# Patient Record
Sex: FEMALE | Race: OTHER | NOT HISPANIC OR LATINO
[De-identification: names, ages, dates, MRNs, and addresses within clinical notes are randomized per-mention and may not be internally consistent; named-entity substitution may affect disease eponyms.]

---

## 2023-03-21 ENCOUNTER — NON-APPOINTMENT (OUTPATIENT)
Age: 23
End: 2023-03-21

## 2023-11-29 ENCOUNTER — APPOINTMENT (OUTPATIENT)
Dept: PSYCHIATRY | Facility: CLINIC | Age: 23
End: 2023-11-29
Payer: MEDICAID

## 2023-11-29 PROBLEM — Z00.00 ENCOUNTER FOR PREVENTIVE HEALTH EXAMINATION: Status: ACTIVE | Noted: 2023-11-29

## 2023-11-29 PROCEDURE — 90791 PSYCH DIAGNOSTIC EVALUATION: CPT

## 2023-12-04 ENCOUNTER — APPOINTMENT (OUTPATIENT)
Dept: PSYCHIATRY | Facility: CLINIC | Age: 23
End: 2023-12-04
Payer: MEDICAID

## 2023-12-04 PROCEDURE — ZZZZZ: CPT

## 2023-12-27 ENCOUNTER — EMERGENCY (EMERGENCY)
Facility: HOSPITAL | Age: 23
LOS: 1 days | Discharge: ANOTHER HOSPITAL | End: 2023-12-27
Attending: EMERGENCY MEDICINE
Payer: MEDICAID

## 2023-12-27 VITALS
TEMPERATURE: 98 F | SYSTOLIC BLOOD PRESSURE: 112 MMHG | DIASTOLIC BLOOD PRESSURE: 69 MMHG | RESPIRATION RATE: 18 BRPM | HEART RATE: 99 BPM | OXYGEN SATURATION: 99 % | WEIGHT: 110.01 LBS

## 2023-12-27 DIAGNOSIS — F33.2 MAJOR DEPRESSIVE DISORDER, RECURRENT SEVERE WITHOUT PSYCHOTIC FEATURES: ICD-10-CM

## 2023-12-27 LAB
AMPHET UR-MCNC: NEGATIVE — SIGNIFICANT CHANGE UP
AMPHET UR-MCNC: NEGATIVE — SIGNIFICANT CHANGE UP
ANION GAP SERPL CALC-SCNC: 4 MMOL/L — LOW (ref 5–17)
ANION GAP SERPL CALC-SCNC: 4 MMOL/L — LOW (ref 5–17)
APAP SERPL-MCNC: <2 UG/ML — LOW (ref 10–30)
APAP SERPL-MCNC: <2 UG/ML — LOW (ref 10–30)
APPEARANCE UR: CLEAR — SIGNIFICANT CHANGE UP
APPEARANCE UR: CLEAR — SIGNIFICANT CHANGE UP
BARBITURATES UR SCN-MCNC: NEGATIVE — SIGNIFICANT CHANGE UP
BARBITURATES UR SCN-MCNC: NEGATIVE — SIGNIFICANT CHANGE UP
BASOPHILS # BLD AUTO: 0.03 K/UL — SIGNIFICANT CHANGE UP (ref 0–0.2)
BASOPHILS # BLD AUTO: 0.03 K/UL — SIGNIFICANT CHANGE UP (ref 0–0.2)
BASOPHILS NFR BLD AUTO: 0.3 % — SIGNIFICANT CHANGE UP (ref 0–2)
BASOPHILS NFR BLD AUTO: 0.3 % — SIGNIFICANT CHANGE UP (ref 0–2)
BENZODIAZ UR-MCNC: NEGATIVE — SIGNIFICANT CHANGE UP
BENZODIAZ UR-MCNC: NEGATIVE — SIGNIFICANT CHANGE UP
BILIRUB UR-MCNC: NEGATIVE — SIGNIFICANT CHANGE UP
BILIRUB UR-MCNC: NEGATIVE — SIGNIFICANT CHANGE UP
BUN SERPL-MCNC: 12 MG/DL — SIGNIFICANT CHANGE UP (ref 7–18)
BUN SERPL-MCNC: 12 MG/DL — SIGNIFICANT CHANGE UP (ref 7–18)
CALCIUM SERPL-MCNC: 9.3 MG/DL — SIGNIFICANT CHANGE UP (ref 8.4–10.5)
CALCIUM SERPL-MCNC: 9.3 MG/DL — SIGNIFICANT CHANGE UP (ref 8.4–10.5)
CHLORIDE SERPL-SCNC: 104 MMOL/L — SIGNIFICANT CHANGE UP (ref 96–108)
CHLORIDE SERPL-SCNC: 104 MMOL/L — SIGNIFICANT CHANGE UP (ref 96–108)
CO2 SERPL-SCNC: 29 MMOL/L — SIGNIFICANT CHANGE UP (ref 22–31)
CO2 SERPL-SCNC: 29 MMOL/L — SIGNIFICANT CHANGE UP (ref 22–31)
COCAINE METAB.OTHER UR-MCNC: NEGATIVE — SIGNIFICANT CHANGE UP
COCAINE METAB.OTHER UR-MCNC: NEGATIVE — SIGNIFICANT CHANGE UP
COLOR SPEC: YELLOW — SIGNIFICANT CHANGE UP
COLOR SPEC: YELLOW — SIGNIFICANT CHANGE UP
CREAT SERPL-MCNC: 0.86 MG/DL — SIGNIFICANT CHANGE UP (ref 0.5–1.3)
CREAT SERPL-MCNC: 0.86 MG/DL — SIGNIFICANT CHANGE UP (ref 0.5–1.3)
DIFF PNL FLD: NEGATIVE — SIGNIFICANT CHANGE UP
DIFF PNL FLD: NEGATIVE — SIGNIFICANT CHANGE UP
EGFR: 98 ML/MIN/1.73M2 — SIGNIFICANT CHANGE UP
EGFR: 98 ML/MIN/1.73M2 — SIGNIFICANT CHANGE UP
EOSINOPHIL # BLD AUTO: 0.1 K/UL — SIGNIFICANT CHANGE UP (ref 0–0.5)
EOSINOPHIL # BLD AUTO: 0.1 K/UL — SIGNIFICANT CHANGE UP (ref 0–0.5)
EOSINOPHIL NFR BLD AUTO: 1 % — SIGNIFICANT CHANGE UP (ref 0–6)
EOSINOPHIL NFR BLD AUTO: 1 % — SIGNIFICANT CHANGE UP (ref 0–6)
ETHANOL SERPL-MCNC: <3 MG/DL — SIGNIFICANT CHANGE UP (ref 0–10)
ETHANOL SERPL-MCNC: <3 MG/DL — SIGNIFICANT CHANGE UP (ref 0–10)
GLUCOSE SERPL-MCNC: 94 MG/DL — SIGNIFICANT CHANGE UP (ref 70–99)
GLUCOSE SERPL-MCNC: 94 MG/DL — SIGNIFICANT CHANGE UP (ref 70–99)
GLUCOSE UR QL: NEGATIVE MG/DL — SIGNIFICANT CHANGE UP
GLUCOSE UR QL: NEGATIVE MG/DL — SIGNIFICANT CHANGE UP
HCG SERPL-ACNC: <1 MIU/ML — SIGNIFICANT CHANGE UP
HCG SERPL-ACNC: <1 MIU/ML — SIGNIFICANT CHANGE UP
HCT VFR BLD CALC: 39.3 % — SIGNIFICANT CHANGE UP (ref 34.5–45)
HCT VFR BLD CALC: 39.3 % — SIGNIFICANT CHANGE UP (ref 34.5–45)
HGB BLD-MCNC: 13 G/DL — SIGNIFICANT CHANGE UP (ref 11.5–15.5)
HGB BLD-MCNC: 13 G/DL — SIGNIFICANT CHANGE UP (ref 11.5–15.5)
IMM GRANULOCYTES NFR BLD AUTO: 0.3 % — SIGNIFICANT CHANGE UP (ref 0–0.9)
IMM GRANULOCYTES NFR BLD AUTO: 0.3 % — SIGNIFICANT CHANGE UP (ref 0–0.9)
KETONES UR-MCNC: NEGATIVE MG/DL — SIGNIFICANT CHANGE UP
KETONES UR-MCNC: NEGATIVE MG/DL — SIGNIFICANT CHANGE UP
LEUKOCYTE ESTERASE UR-ACNC: NEGATIVE — SIGNIFICANT CHANGE UP
LEUKOCYTE ESTERASE UR-ACNC: NEGATIVE — SIGNIFICANT CHANGE UP
LYMPHOCYTES # BLD AUTO: 2.74 K/UL — SIGNIFICANT CHANGE UP (ref 1–3.3)
LYMPHOCYTES # BLD AUTO: 2.74 K/UL — SIGNIFICANT CHANGE UP (ref 1–3.3)
LYMPHOCYTES # BLD AUTO: 28.1 % — SIGNIFICANT CHANGE UP (ref 13–44)
LYMPHOCYTES # BLD AUTO: 28.1 % — SIGNIFICANT CHANGE UP (ref 13–44)
MCHC RBC-ENTMCNC: 30 PG — SIGNIFICANT CHANGE UP (ref 27–34)
MCHC RBC-ENTMCNC: 30 PG — SIGNIFICANT CHANGE UP (ref 27–34)
MCHC RBC-ENTMCNC: 33.1 GM/DL — SIGNIFICANT CHANGE UP (ref 32–36)
MCHC RBC-ENTMCNC: 33.1 GM/DL — SIGNIFICANT CHANGE UP (ref 32–36)
MCV RBC AUTO: 90.8 FL — SIGNIFICANT CHANGE UP (ref 80–100)
MCV RBC AUTO: 90.8 FL — SIGNIFICANT CHANGE UP (ref 80–100)
METHADONE UR-MCNC: NEGATIVE — SIGNIFICANT CHANGE UP
METHADONE UR-MCNC: NEGATIVE — SIGNIFICANT CHANGE UP
MONOCYTES # BLD AUTO: 0.79 K/UL — SIGNIFICANT CHANGE UP (ref 0–0.9)
MONOCYTES # BLD AUTO: 0.79 K/UL — SIGNIFICANT CHANGE UP (ref 0–0.9)
MONOCYTES NFR BLD AUTO: 8.1 % — SIGNIFICANT CHANGE UP (ref 2–14)
MONOCYTES NFR BLD AUTO: 8.1 % — SIGNIFICANT CHANGE UP (ref 2–14)
NEUTROPHILS # BLD AUTO: 6.07 K/UL — SIGNIFICANT CHANGE UP (ref 1.8–7.4)
NEUTROPHILS # BLD AUTO: 6.07 K/UL — SIGNIFICANT CHANGE UP (ref 1.8–7.4)
NEUTROPHILS NFR BLD AUTO: 62.2 % — SIGNIFICANT CHANGE UP (ref 43–77)
NEUTROPHILS NFR BLD AUTO: 62.2 % — SIGNIFICANT CHANGE UP (ref 43–77)
NITRITE UR-MCNC: NEGATIVE — SIGNIFICANT CHANGE UP
NITRITE UR-MCNC: NEGATIVE — SIGNIFICANT CHANGE UP
NRBC # BLD: 0 /100 WBCS — SIGNIFICANT CHANGE UP (ref 0–0)
NRBC # BLD: 0 /100 WBCS — SIGNIFICANT CHANGE UP (ref 0–0)
OPIATES UR-MCNC: NEGATIVE — SIGNIFICANT CHANGE UP
OPIATES UR-MCNC: NEGATIVE — SIGNIFICANT CHANGE UP
PCP SPEC-MCNC: SIGNIFICANT CHANGE UP
PCP SPEC-MCNC: SIGNIFICANT CHANGE UP
PCP UR-MCNC: NEGATIVE — SIGNIFICANT CHANGE UP
PCP UR-MCNC: NEGATIVE — SIGNIFICANT CHANGE UP
PH UR: 5.5 — SIGNIFICANT CHANGE UP (ref 5–8)
PH UR: 5.5 — SIGNIFICANT CHANGE UP (ref 5–8)
PLATELET # BLD AUTO: 272 K/UL — SIGNIFICANT CHANGE UP (ref 150–400)
PLATELET # BLD AUTO: 272 K/UL — SIGNIFICANT CHANGE UP (ref 150–400)
POTASSIUM SERPL-MCNC: 4 MMOL/L — SIGNIFICANT CHANGE UP (ref 3.5–5.3)
POTASSIUM SERPL-MCNC: 4 MMOL/L — SIGNIFICANT CHANGE UP (ref 3.5–5.3)
POTASSIUM SERPL-SCNC: 4 MMOL/L — SIGNIFICANT CHANGE UP (ref 3.5–5.3)
POTASSIUM SERPL-SCNC: 4 MMOL/L — SIGNIFICANT CHANGE UP (ref 3.5–5.3)
PROT UR-MCNC: NEGATIVE MG/DL — SIGNIFICANT CHANGE UP
PROT UR-MCNC: NEGATIVE MG/DL — SIGNIFICANT CHANGE UP
RBC # BLD: 4.33 M/UL — SIGNIFICANT CHANGE UP (ref 3.8–5.2)
RBC # BLD: 4.33 M/UL — SIGNIFICANT CHANGE UP (ref 3.8–5.2)
RBC # FLD: 12.5 % — SIGNIFICANT CHANGE UP (ref 10.3–14.5)
RBC # FLD: 12.5 % — SIGNIFICANT CHANGE UP (ref 10.3–14.5)
SALICYLATES SERPL-MCNC: <1.7 MG/DL — LOW (ref 2.8–20)
SALICYLATES SERPL-MCNC: <1.7 MG/DL — LOW (ref 2.8–20)
SARS-COV-2 RNA SPEC QL NAA+PROBE: SIGNIFICANT CHANGE UP
SARS-COV-2 RNA SPEC QL NAA+PROBE: SIGNIFICANT CHANGE UP
SODIUM SERPL-SCNC: 137 MMOL/L — SIGNIFICANT CHANGE UP (ref 135–145)
SODIUM SERPL-SCNC: 137 MMOL/L — SIGNIFICANT CHANGE UP (ref 135–145)
SP GR SPEC: 1.01 — SIGNIFICANT CHANGE UP (ref 1–1.03)
SP GR SPEC: 1.01 — SIGNIFICANT CHANGE UP (ref 1–1.03)
THC UR QL: NEGATIVE — SIGNIFICANT CHANGE UP
THC UR QL: NEGATIVE — SIGNIFICANT CHANGE UP
UROBILINOGEN FLD QL: 0.2 MG/DL — SIGNIFICANT CHANGE UP (ref 0.2–1)
UROBILINOGEN FLD QL: 0.2 MG/DL — SIGNIFICANT CHANGE UP (ref 0.2–1)
WBC # BLD: 9.76 K/UL — SIGNIFICANT CHANGE UP (ref 3.8–10.5)
WBC # BLD: 9.76 K/UL — SIGNIFICANT CHANGE UP (ref 3.8–10.5)
WBC # FLD AUTO: 9.76 K/UL — SIGNIFICANT CHANGE UP (ref 3.8–10.5)
WBC # FLD AUTO: 9.76 K/UL — SIGNIFICANT CHANGE UP (ref 3.8–10.5)

## 2023-12-27 PROCEDURE — 81003 URINALYSIS AUTO W/O SCOPE: CPT

## 2023-12-27 PROCEDURE — 87635 SARS-COV-2 COVID-19 AMP PRB: CPT

## 2023-12-27 PROCEDURE — 99285 EMERGENCY DEPT VISIT HI MDM: CPT

## 2023-12-27 PROCEDURE — 99285 EMERGENCY DEPT VISIT HI MDM: CPT | Mod: 25

## 2023-12-27 PROCEDURE — 90792 PSYCH DIAG EVAL W/MED SRVCS: CPT | Mod: 95

## 2023-12-27 PROCEDURE — 80048 BASIC METABOLIC PNL TOTAL CA: CPT

## 2023-12-27 PROCEDURE — 93005 ELECTROCARDIOGRAM TRACING: CPT

## 2023-12-27 PROCEDURE — 85025 COMPLETE CBC W/AUTO DIFF WBC: CPT

## 2023-12-27 PROCEDURE — 80307 DRUG TEST PRSMV CHEM ANLYZR: CPT

## 2023-12-27 PROCEDURE — 36415 COLL VENOUS BLD VENIPUNCTURE: CPT

## 2023-12-27 PROCEDURE — 84702 CHORIONIC GONADOTROPIN TEST: CPT
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
## 2023-12-27 NOTE — ED ADULT NURSE REASSESSMENT NOTE - NS ED NURSE REASSESS COMMENT FT1
Patient taken to holding area for telepsych consult on tele cart. Patient calm and cooperative. 1:1 with patient.

## 2023-12-27 NOTE — ED PROVIDER NOTE - CLINICAL SUMMARY MEDICAL DECISION MAKING FREE TEXT BOX
23 yr old female with hx of schizotypal? vs schizoeffective on abilify 8mg? buspar 10mg and something for sleep presents to ed c/o si thoughts, depressed mood. 1 day ago thought about OD. didn't OD. when she was 20 tried to OD on advil 8 pills and cut self. pt constantly self doubts, feels lonely. wants help.    psych eval- psychosis vs Mood D/O vs schizo. labs, ekg, maintain 1:1 23 yr old female with hx of schizotypal? vs schizoeffective on abilify 8mg? buspar 10mg and something for sleep presents to ed c/o si thoughts, depressed mood. 1 day ago thought about OD. didn't OD. when she was 20 tried to OD on advil 8 pills and cut self. pt constantly self doubts, feels lonely. wants help.    psych eval- psychosis vs Mood D/O vs schizo. labs, ekg, maintain 1:1    [place] GREEN[place]: ER attending:  Patient is medically cleared for inpatient psych admission
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
## 2023-12-27 NOTE — ED ADULT NURSE NOTE - ED STAT RN HANDOFF TIME
Clinical Summary
  Created on: 2019
 
 Margaret Ferreira
 External Reference #: [id number]
 : 31
 Sex: Female
 
 Demographics
 
 
+-----------------------+----------------------+
| Address               | [address]        |
|                       | [place]  [address] |
+-----------------------+----------------------+
| Home Phone            | [phone]      |
+-----------------------+----------------------+
| Preferred Language    | Unknown              |
+-----------------------+----------------------+
| Marital Status        |               |
+-----------------------+----------------------+
| Orthodoxy Affiliation | Unknown              |
+-----------------------+----------------------+
| Race                  | Unknown              |
+-----------------------+----------------------+
| Ethnic Group          | Unknown              |
+-----------------------+----------------------+
 
 
 Author
 
 
+--------------+--------------------------------------------+
| Author       | [facility]  |
|              | and [place]                                |
+--------------+--------------------------------------------+
| Organization | [facility]  |
|              | and [place]                                |
+--------------+--------------------------------------------+
| Address      | Unknown                                    |
+--------------+--------------------------------------------+
| Phone        | Unavailable                                |
+--------------+--------------------------------------------+
 
 
 
 Support
 
 
+--------------+--------------+---------+-----------------+
| Name         | Relationship | Address | Phone           |
+--------------+--------------+---------+-----------------+
| Lawson Hanna | ECON         | Unknown | [phone] |
+--------------+--------------+---------+-----------------+
 
 
 
 Care Team Providers
 
 
 
+-----------------------------+------+-----------------+
| Care Team Member Name       | Role | Phone           |
+-----------------------------+------+-----------------+
| Bessy Paulino | PCP  | [phone] |
|  PA-C                       |      |                 |
+-----------------------------+------+-----------------+
 
 
 
 Allergies
 
 
+----------------+----------------------+----------+----------+----------------------+
| Active Allergy | Reactions            | Severity | Noted    | Comments             |
|                |                      |          | Date     |                      |
+----------------+----------------------+----------+----------+----------------------+
| Lisinopril     | Other (See Comments) | Low      | 20 |   Cough              |
|                |                      |          | 14       |                      |
+----------------+----------------------+----------+----------+----------------------+
| Naproxen       | Other (See Comments) |          | 20 |   Reaction not       |
|                |                      |          | 18       | specified in outside |
|                |                      |          |          |  medical records     |
+----------------+----------------------+----------+----------+----------------------+
 
 
 
 Medications
 
 
+----------------------+----------------------+-----------+---------+------+------+-------+
| Medication           | Sig                  | Dispensed | Refills | Star | End  | Statu |
|                      |                      |           |         | t    | Date | s     |
|                      |                      |           |         | Date |      |       |
+----------------------+----------------------+-----------+---------+------+------+-------+
|   Calcium Carbonate  | Take 600 mg by mouth |           | 0       |      |      | Activ |
| (CALCIUM 600 PO)     |  Daily.              |           |         |      |      | e     |
+----------------------+----------------------+-----------+---------+------+------+-------+
|   Docosahexaenoic    | Take  by mouth.      |           | 0       |      |      | Activ |
| Acid (DHA OMEGA 3)   |                      |           |         |      |      | e     |
| 100 MG CAPS          |                      |           |         |      |      |       |
+----------------------+----------------------+-----------+---------+------+------+-------+
|   Multiple           | Take  by mouth       |           | 0       |      |      | Activ |
| Vitamins-Minerals    | Daily.               |           |         |      |      | e     |
| (MULTIVITAMIN PO)    |                      |           |         |      |      |       |
+----------------------+----------------------+-----------+---------+------+------+-------+
|   omeprazole         | Take 20 mg by mouth  |           | 0       |      |      | Activ |
| (PRILOSEC) 20 mg     | every morning        |           |         |      |      | e     |
| capsule              | (before breakfast).  |           |         |      |      |       |
+----------------------+----------------------+-----------+---------+------+------+-------+
|   traMADol (ULTRAM)  | Take 100 mg by mouth |           | 0       |      |      | Activ |
| 50 mg tablet         |  4 times daily.      |           |         |      |      | e     |
+----------------------+----------------------+-----------+---------+------+------+-------+
|   Cholecalciferol    | Take  by mouth       |           | 0       |      |      | Activ |
| (VITAMIN D-3) 2000   | Daily.               |           |         |      |      | e     |
| units CAPS           |                      |           |         |      |      |       |
+----------------------+----------------------+-----------+---------+------+------+-------+
|   Spacer/Aero        | Use with inhaler as  |   1 each  | 1       | 01/1 |      | Activ |
| Chamber Mouthpiece   | directed.            |           |         | 3/20 |      | e     |
 
| MISC                 |                      |           |         | 15   |      |       |
+----------------------+----------------------+-----------+---------+------+------+-------+
|   albuterol (PROAIR  | Inhale 2 puffs into  |   1       | 5       | 03/2 |      | Activ |
| HFA) 90 mcg/puff     | the lungs every 6    | Inhaler   |         | 2/20 |      | e     |
| inhaler              | hours as needed for  |           |         | 16   |      |       |
|                      | Wheezing or          |           |         |      |      |       |
|                      | Shortness of Breath. |           |         |      |      |       |
+----------------------+----------------------+-----------+---------+------+------+-------+
|   losartan (COZAAR)  | Take 100 mg by mouth |           | 0       |      |      | Activ |
| 100 MG tablet        |  Daily.              |           |         |      |      | e     |
+----------------------+----------------------+-----------+---------+------+------+-------+
|   NITROFURANTOIN     | Take  by mouth       |           | 0       |      |      | Activ |
| MACROCRYSTAL PO      | Daily.               |           |         |      |      | e     |
+----------------------+----------------------+-----------+---------+------+------+-------+
|   DULoxetine         | Take 60 mg by mouth  |           | 0       |      |      | Activ |
| (CYMBALTA) 60 mg DR  | Daily.               |           |         |      |      | e     |
| capsule              |                      |           |         |      |      |       |
+----------------------+----------------------+-----------+---------+------+------+-------+
|   gabapentin         | Take 300 mg by mouth |           | 0       |      |      | Activ |
| (NEURONTIN) 300 mg   |  3 times daily.      |           |         |      |      | e     |
| capsule              |                      |           |         |      |      |       |
+----------------------+----------------------+-----------+---------+------+------+-------+
|                      | Inhale 1 puff into   |           | 0       | 04/1 |      | Activ |
| umeclidinium-vilante | the lungs.           |           |         | 0/20 |      | e     |
| rol (ANORO ELLIPTA)  |                      |           |         | 18   |      |       |
| 62.5-25 mcg/puff     |                      |           |         |      |      |       |
| inhaler              |                      |           |         |      |      |       |
+----------------------+----------------------+-----------+---------+------+------+-------+
|   diclofenac         | Take 50 mg by mouth  |           | 0       |      |      | Activ |
| (VOLTAREN) 50 mg EC  | 2 times daily.       |           |         |      |      | e     |
| tablet               |                      |           |         |      |      |       |
+----------------------+----------------------+-----------+---------+------+------+-------+
|   pramipexole        | TAKE ONE TABLET BY   |   180     | 0       | 09/0 |      | Activ |
| (MIRAPEX) 0.25 mg    | MOUTH TWICE A DAY    | tablet    |         | 5/20 |      | e     |
| tablet               |                      |           |         | 18   |      |       |
+----------------------+----------------------+-----------+---------+------+------+-------+
|   levothyroxine      | Take 150 mcg by      |           | 0       |      |      | Activ |
| (SYNTHROID) 150 mcg  | mouth every morning. |           |         |      |      | e     |
| tablet               |                      |           |         |      |      |       |
+----------------------+----------------------+-----------+---------+------+------+-------+
|   Misc Natural       | Take  by mouth       |           | 0       |      |      | Activ |
| Products             | Daily.               |           |         |      |      | e     |
| (GLUCOS-CHONDROIT-MS |                      |           |         |      |      |       |
| M COMPLEX) TABS      |                      |           |         |      |      |       |
+----------------------+----------------------+-----------+---------+------+------+-------+
|                      | Take 1 tablet by     |           | 0       |      |      | Activ |
| HYDROcodone-acetamin | mouth every 6 hours  |           |         |      |      | e     |
| ophen (NORCO) 5-325  | as needed for Pain.  |           |         |      |      |       |
| mg per tablet        |                      |           |         |      |      |       |
+----------------------+----------------------+-----------+---------+------+------+-------+
|                      | Take  by mouth.      |           | 0       |      |      | Activ |
| GLUCOSAMINE-CHONDROI |                      |           |         |      |      | e     |
| TIN PO               |                      |           |         |      |      |       |
+----------------------+----------------------+-----------+---------+------+------+-------+
|   albuterol 90       | Inhale 2 puffs into  |           | 0       | 02/2 | 02/2 | Activ |
| mcg/puff inhaler     | the lungs every 4    |           |         | 0/20 | 0/20 | e     |
|                      | hours as needed for  |           |         | 19   | 20   |       |
|                      | Wheezing.            |           |         |      |      |       |
+----------------------+----------------------+-----------+---------+------+------+-------+
|                      | Inhale 1 puff into   |           | 0       | 07/2 |      | Activ |
 
| umeclidinium-vilante | the lungs Daily.     |           |         | 4/20 |      | e     |
| rol (ANORO ELLIPTA)  |                      |           |         | 19   |      |       |
| 62.5-25 mcg/puff     |                      |           |         |      |      |       |
| inhaler              |                      |           |         |      |      |       |
+----------------------+----------------------+-----------+---------+------+------+-------+
 
 
 
 Active Problems
 
 
+----------------------------------------------+------------+
| Problem                                      | Noted Date |
+----------------------------------------------+------------+
| COPD (chronic obstructive pulmonary disease) | 2014 |
+----------------------------------------------+------------+
| GERD (gastroesophageal reflux disease)       |            |
+----------------------------------------------+------------+
| Hiatal hernia                                |            |
+----------------------------------------------+------------+
| Hypertension                                 |            |
+----------------------------------------------+------------+
| Hypothyroidism                               |            |
+----------------------------------------------+------------+
| Urinary incontinence                         |            |
+----------------------------------------------+------------+
| Aortic valve stenosis                        |            |
+----------------------------------------------+------------+
 
 
 
+-----------------------+
|   Overview:   trivial |
+-----------------------+
 
 
 
+------------------------------+---+
| Hearing loss                 |   |
+------------------------------+---+
| Osteoarthrosis               |   |
+------------------------------+---+
| Osteoporosis                 |   |
+------------------------------+---+
| RLS (restless legs syndrome) |   |
+------------------------------+---+
| Scoliosis                    |   |
+------------------------------+---+
| Macular degeneration         |   |
+------------------------------+---+
 
 
 
 Resolved Problems
 
 
+-----------------------------------------------------------------+----------+-----------+
| Problem                                                         | Noted    | Resolved  |
|                                                                 | Date     | Date      |
+-----------------------------------------------------------------+----------+-----------+
 
| Cough                                                           | 20 |  |
|                                                                 | 15       | 6         |
+-----------------------------------------------------------------+----------+-----------+
| Need for vaccination with 13-polyvalent pneumococcal conjugate  | 20 |  |
| vaccine                                                         | 15       | 6         |
+-----------------------------------------------------------------+----------+-----------+
 
 
 
 Immunizations
 
 
+----------------------+----------------------+----------+
| Name                 | Administration Dates | Next Due |
+----------------------+----------------------+----------+
| INFLUENZA 66 Y OR >, | 10/21/2015           |          |
|  TRIVALENT HIGH-DOSE |                      |          |
+----------------------+----------------------+----------+
| INFLUENZA PF 19 Y OR | 2014           |          |
|  >,TRIVALENT         |                      |          |
| RECOMBINANT          |                      |          |
+----------------------+----------------------+----------+
| PNEUMOCOCCAL         | 10/12/2015           |          |
| CONJUGATE 13-VALENT  |                      |          |
| (PCV13)              |                      |          |
+----------------------+----------------------+----------+
| PNEUMOCOCCAL         | 2012           |          |
| POLYSACCHARIDE       |                      |          |
| 23-VALENT (PPSV23)   |                      |          |
+----------------------+----------------------+----------+
| TD PF (5 LF TETANUS) | 2009           |          |
|  (ADOL/ADULT)        |                      |          |
+----------------------+----------------------+----------+
 
 
 
 Family History
 
 
+-----------------+----------+------+----------+
| Medical History | Relation | Name | Comments |
+-----------------+----------+------+----------+
| Allergies       | Father   |      |          |
+-----------------+----------+------+----------+
| Asthma          | Father   |      |          |
+-----------------+----------+------+----------+
| COPD            | Father   |      |          |
+-----------------+----------+------+----------+
| Hypertension    | Father   |      |          |
+-----------------+----------+------+----------+
| Tobacco Use     | Father   |      |          |
+-----------------+----------+------+----------+
| Hypertension    | Mother   |      |          |
+-----------------+----------+------+----------+
| Stroke          | Mother   |      |          |
+-----------------+----------+------+----------+
| Asthma          | Sister   |      |          |
+-----------------+----------+------+----------+
 
 
 
 
+----------+------+----------+----------+
| Relation | Name | Status   | Comments |
+----------+------+----------+----------+
| Father   |      |  | COPD     |
+----------+------+----------+----------+
| Mother   |      |  | old age  |
+----------+------+----------+----------+
| Sister   |      | Alive    |          |
+----------+------+----------+----------+
 
 
 
 Social History
 
 
+-------------------+-------+-----------+--------+------+
| Tobacco Use       | Types | Packs/Day | Years  | Date |
|                   |       |           | Used   |      |
+-------------------+-------+-----------+--------+------+
| Passive Smoke     |       |           |        |      |
| Exposure - Never  |       |           |        |      |
| Smoker            |       |           |        |      |
+-------------------+-------+-----------+--------+------+
 
 
 
+---------------------+---+---+---+
| Smokeless Tobacco:  |   |   |   |
| Never Used          |   |   |   |
+---------------------+---+---+---+
 
 
 
+-----------------------------------------+
| Tobacco Cessation: Counseling Given: No |
+-----------------------------------------+
 
 
 
+-------------+----------------------+---------+----------+
| Alcohol Use | Drinks/Week          | oz/Week | Comments |
+-------------+----------------------+---------+----------+
| No          |   0 Standard drinks  | 0.0     |          |
|             | or equivalent        |         |          |
+-------------+----------------------+---------+----------+
 
 
 
+------------------+---------------+
| Sex Assigned at  | Date Recorded |
| Birth            |               |
+------------------+---------------+
| Not on file      |               |
+------------------+---------------+
 
 
 
+----------------+-------------+-------------+
| Job Start Date | Occupation  | Industry    |
 
+----------------+-------------+-------------+
| Not on file    | Not on file | Not on file |
+----------------+-------------+-------------+
 
 
 
+----------------+--------------+------------+
| Travel History | Travel Start | Travel End |
+----------------+--------------+------------+
 
 
 
+-------------------------------------+
| No recent travel history available. |
+-------------------------------------+
 
 
 
 Last Filed Vital Signs
 
 
+-------------------+---------------------+----------------------+----------+
| Vital Sign        | Reading             | Time Taken           | Comments |
+-------------------+---------------------+----------------------+----------+
| Blood Pressure    | 115/64              | 11/15/2018 11:06 AM  |          |
|                   |                     | PST                  |          |
+-------------------+---------------------+----------------------+----------+
| Pulse             | 77                  | 11/15/2018 11:06 AM  |          |
|                   |                     | PST                  |          |
+-------------------+---------------------+----------------------+----------+
| Temperature       | 36.8   C (98.2   F) | 2018 10:07 AM  |          |
|                   |                     | PDT                  |          |
+-------------------+---------------------+----------------------+----------+
| Respiratory Rate  | -                   | -                    |          |
+-------------------+---------------------+----------------------+----------+
| Oxygen Saturation | 97%                 | 2016 11:32 AM  |          |
|                   |                     | PDT                  |          |
+-------------------+---------------------+----------------------+----------+
| Inhaled Oxygen    | -                   | -                    |          |
| Concentration     |                     |                      |          |
+-------------------+---------------------+----------------------+----------+
| Weight            | 61.2 kg (135 lb)    | 11/15/2018 11:06 AM  |          |
|                   |                     | PST                  |          |
+-------------------+---------------------+----------------------+----------+
| Height            | 162.6 cm (5' 4")    | 11/15/2018 11:06 AM  |          |
|                   |                     | PST                  |          |
+-------------------+---------------------+----------------------+----------+
| Body Mass Index   | 23.17               | 11/15/2018 11:06 AM  |          |
|                   |                     | PST                  |          |
+-------------------+---------------------+----------------------+----------+
 
 
 
 Plan of Treatment
 
 
+---------------------+-----------+------------------------+----------+
| Health Maintenance  | Due Date  | Last Done              | Comments |
+---------------------+-----------+------------------------+----------+
| Vaccine: Zoster (1  |  |                        |          |
 
| of 2)               | 0         |                        |          |
+---------------------+-----------+------------------------+----------+
| Vaccine:            |  | 2009             |          |
| Dtap/Tdap/Td (1 -   | 9         |                        |          |
| Tdap)               |           |                        |          |
+---------------------+-----------+------------------------+----------+
| Adult Annual        |  |                        |          |
| Wellness Visit      | 5         |                        |          |
+---------------------+-----------+------------------------+----------+
| Vaccine: Influenza  |  | 10/21/2015, 2014 |          |
| (#1)                | 9         |                        |          |
+---------------------+-----------+------------------------+----------+
| Vaccine:            | Completed | 10/12/2015, 2012 |          |
| Pneumococcal 65+    |           |                        |          |
+---------------------+-----------+------------------------+----------+
 
 
 
 Results
 Not on filefrom Last 3 Months
 
 Insurance
 
 
+----------+--------+-------------+--------+-------------+---------+--------+
| Payer    | Benefi | Subscriber  | Effect | Phone       | Address | Type   |
|          | t Plan | ID          | venecia    |             |         |        |
|          |  /     |             | Dates  |             |         |        |
|          | Group  |             |        |             |         |        |
+----------+--------+-------------+--------+-------------+---------+--------+
| MEDICARE | MEDICA | [id number]  | 19 | [phone] |         | [employer] |
|          | RE     |             | 95-Pre | 5           |         | re     |
|          | PART A |             | sent   |             |         |        |
|          |  AND B |             |        |             |         |        |
+----------+--------+-------------+--------+-------------+---------+--------+
| [employer]     | [employer]ARP   | [id number] | 20 | [phone] |         | Indemn |
|          | MDCR   |             | 15-Pre | 0           |         | ity    |
|          | SUPPL  |             | sent   |             |         |        |
+----------+--------+-------------+--------+-------------+---------+--------+
 
 
 
+----------------------+--------+-------------+--------+-------------+---------------------+
| Guarantor Name       | Accoun | Relation to | Date   | Phone       | Billing Address     |
|                      | t Type |  Patient    | of     |             |                     |
|                      |        |             | Birth  |             |                     |
+----------------------+--------+-------------+--------+-------------+---------------------+
| Margraet Ferreira | Person | Self        | / |             |   [address]     |
|                      | al/Fam |             | 1929   | [phone] | [place] [id number] |
|                      | [place]    |             |        | [address] (Home)    |                     |
+----------------------+--------+-------------+--------+-------------+---------------------+
 
 
 
 Advance Directives
 
 
+-----------+-----------------+----------------+-------------+
| Type      | Date Recorded   | Patient        | Explanation |
|           |                 | Representative |             |
 
+-----------+-----------------+----------------+-------------+
| Power of  |                 |                |             |
|   |                 |                |             |
+-----------+-----------------+----------------+-------------+
| Advance   | 2015  1:04 |                |             |
| Directive |  PM             |                |             |
+-----------+-----------------+----------------+-------------+ 19:08

## 2023-12-27 NOTE — ED BEHAVIORAL HEALTH NOTE - BEHAVIORAL HEALTH NOTE
===================    PRE-HOSPITAL COURSE    ==================    SOURCE:  ED provider and ED documentation     DETAILS:  Pt BiBself for worsening SI with plan to OD.      ============    ED COURSE    ============    SOURCE: ED provider and secondhand ED documentation.    ARRIVAL: Per ED documentation patient BiBself to ED. Patient cooperative with triage process.     BELONGINGS: Unknown   BEHAVIOR: ED provider described patient to be calm, remains in behavioral and impulse control, and is not in restraints. Pt currently has 1:1 sitter.  Pt is not displaying aggression towards staff or others and was described as cooperative. Per provider, pt presenting with flat affect and mood is congruent with affect. Pt has a linear thought process and able to answer questions appropriately. Provider stated that the patient is endorsing current SI with plan to OD. No mention of HI. Per provider pt not endorsing A/VH but did mention an internal voice that is talking to her.  There are no visible marks, bruises, or lacerations on the body. Provider reports that the patient appears to have fair hygiene.   TREATMENT: No medication administered. No restraints.     VISITORS: None     -----------------------------------------------   COVID Exposure Screen- collateral (i.e. third-party, chart review, belongings, etc; include EMS and ED staff)   ---------------------------------------------------   1. Has the patient had a COVID-19 test in the last 90 days? Unknown.   2. Has the patient tested positive for COVID-19 antibodies? Unknown.   3.Has the patient received 2 doses of the COVID-19 vaccine?  Unknown.   4. In the past 10 days, has the patient been around anyone with a positive COVID-19 test?* Unknown.   5.Has the patient been out of [place] within the past 10 days? Unknown.

## 2023-12-27 NOTE — ED BEHAVIORAL HEALTH ASSESSMENT NOTE - NSBHATTESTBILLING_PSY_A_CORE
98112-Hjqyupuulbr diagnostic evaluation with medical services [id number]-[id number] diagnostic evaluation with medical services

## 2023-12-27 NOTE — ED ADULT NURSE NOTE - CHIEF COMPLAINT QUOTE
COLONOSCOPY OPERATIVE NOTE    GASTROENTEROLOGIST: Amy Leon MD    ASSISTANT: None    2022    Etelvina Kenney  : 1969  MRN: [id number]    PRE-OPERATIVE DIAGNOSES / INDICATIONS:  · Colon cancer screening in average risk individual    COLONOSCOPY DONE 2022, REVEALED:  · 2 sessile sigmoid colon polyps were noted, measuring 4 and 5 mm.  These were removed using cold snare polypectomy technique.    · The bowel prep was suboptimal in the cecum making it technically difficult to evaluate the cecum thoroughly.  · The bowel prep involving the remainder of the colon was initially suboptimal upon initial intubation at [place] 5.  After thorough irrigation and suctioning, prep significantly improved to [place] 7 for evaluation of the remainder of the colon was able to be performed.    OPERATION PERFORMED:  · Colonoscopy to the cecum with the help of anesthesia service.    EBL:  None    ANESTHESIA:  Sedation provided by anesthesia service.  Oxygen nasal cannula.    SPECIMENS: See lab results.    COMPLICATIONS: None      IMPLANTS: N/A.    ASSISTANT TASKS: N/A.    DESCRIPTION OF OPERATION:  The risks and benefits of the procedure were explained to the patient, which are not limited only to but included bleeding, infection, missed polyps and lesions, perforation, device malfunction, and death; the patient demonstrated an understanding and then consented to the procedure.  Informed consent discussion included discussion of risks of sedation as well. The history was reviewed and the patient was examined. The patient was stable for the procedure and monitored throughout.      Digital rectal exam revealed normal anal tone. No mass lesions were felt on rectal exam.  The endoscope was then introduced into the rectum and advanced to the cecum. Appendiceal orifice and ileocecal valve were visualized.  Terminal ileum was intubated.  Normal terminal ileal mucosa visualized without polyps, ulcerations, vascular  abnormalities, or additional lesions.  At that point, I slowly and carefully withdrew with attention to all folds, haustra and landmarks and a withdrawal time exceeding 8 minutes.     Terminal ileum: Not evaluated  Cecum:  Normal mucosa  Ascending colon:   Normal mucosa  Transverse colon: Normal mucosa  Descending colon: Normal mucosa  Sigmoid colon:2 sessile sigmoid colon polyps were noted, measuring 4 and 5 mm.  These were removed using cold snare polypectomy technique  Rectum:  Normal mucosa    Retroflexed view of the anal junction was performed and findings were within normal limits. No mass lesions or fissures were noticed in the anal canal.     No active bleeding or signs of recent bleeding visualized.     Bowel preparation was good with a [place] Bowel Preparation Score of 8.     Following the procedure, the colon was decompressed, the endoscope removed and the procedure was terminated. Immediate postoperative condition of patient was normal and the patient was transferred to the recovery area.    RECOMMENDATIONS:  · Recommend repeat colonoscopy in 1 year with extended MiraLax bowel prep and 3 days of clears to better evaluate the cecum.  · High fiber diet.  · Stool softeners as needed.  · Complete hemostasis was also appreciated after the procedure was performed.  Patient was instructed to report to the emergency department if overt gastrointestinal bleeding occurs and verbalized understanding.  · Await pathology with additional management recommendations to follow if necessitated.  · Outpatient clinic follow-up with referring provider      feeling depressed and lonely, suicidal thoughts yesterday (thought of drinking whole bottle of pills), no HI

## 2023-12-27 NOTE — ED BEHAVIORAL HEALTH ASSESSMENT NOTE - DETAILS
NA see hpi will not change dispo will handoff after patient's chart is reviewed by RIMMA self-referred

## 2023-12-27 NOTE — ED ADULT NURSE NOTE - OBJECTIVE STATEMENT
pt came in the er with c/o feeling depressed and lonely, suicidal thoughts yesterday (thought of drinking whole bottle of pills), no HI/safety maintained /side rails up /yellow gown /1;1 started /

## 2023-12-27 NOTE — ED ADULT NURSE NOTE - FINAL NURSING ELECTRONIC SIGNATURE
[facility] [address]  Phone: [phone]  Fax: [phone]    Johana Carrillo MD        September 14, 2020     Patient: Desi Moorcho   YOB: 1948       To Whom It May Concern: It is my medical opinion that Veronica Care requires a disability parking placard for the following reasons:  He cannot walk without assistance from another person or the use of an assistance device (cane, crutch, prosthetic device, wheelchair, etc.). He cannot walk 200 feet without stopping to rest.  Duration of need: permanent    If you have any questions or concerns, please don't hesitate to call.     Sincerely,        Johana Carrillo MD 28-Dec-2023 02:29

## 2023-12-27 NOTE — ED ADULT TRIAGE NOTE - CHIEF COMPLAINT QUOTE
feeling depressed and lonely, suicidal thoughts yesterday (thought of drinking whole bottle of pills), no HI

## 2023-12-27 NOTE — ED BEHAVIORAL HEALTH ASSESSMENT NOTE - SUMMARY
Zully is a 24 y/o English-speaking F, domiciled alone, unemployed, single, with np pmhx and a pphx of MDD and schizotypal disorder presenting to ED by self for worsening symptoms of depression and SI.    Patient meets criteria for a severe depressive episode with suicidality. Patient has insight to her symptoms and is help-seeking. Patient would benefit from inpatient psychiatric hospitalization for safety, stabilization and medication management. She is at elevated chronic risk due to past SA and psychiatric diagnosis, and at elevated acute risk considering she has SI with vague plan. Patient meets criteria for voluntary inpatient psychiatric admission.    Plan:  - Admit 9.13  - Continue home meds  - If agitated can give haldol 2.5mg PO q6hrs if anxious can give ativan 1mg po q6hrs, in severe agitation can give both IM Zully is a 22 y/o English-speaking F, domiciled alone, unemployed, single, with np pmhx and a pphx of MDD and schizotypal disorder presenting to ED by self for worsening symptoms of depression and SI.    Patient meets criteria for a severe depressive episode with suicidality. Patient has insight to her symptoms and is help-seeking. Patient would benefit from inpatient psychiatric hospitalization for safety, stabilization and medication management. She is at elevated chronic risk due to past SA and psychiatric diagnosis, and at elevated acute risk considering she has SI with vague plan. Patient meets criteria for voluntary inpatient psychiatric admission.    Plan:  - Admit 9.13  - Continue home meds  - If agitated can give haldol 2.5mg PO q6hrs if anxious can give ativan 1mg po q6hrs, in severe agitation can give both IM

## 2023-12-27 NOTE — ED PROVIDER NOTE - OBJECTIVE STATEMENT
23 yr old female with hx of schizotypal? vs schizoeffective on abilify 8mg? buspar 10mg and something for sleep presents to ed c/o si thoughts, depressed mood. 1 day ago thought about OD. didn't OD. when she was 20 tried to OD on advil 8 pills and cut self. pt constantly self doubts, feels lonely. wants help.

## 2023-12-27 NOTE — ED ADULT NURSE NOTE - NSFALLUNIVINTERV_ED_ALL_ED
Bed/Stretcher in lowest position, wheels locked, appropriate side rails in place/Call bell, personal items and telephone in reach/Instruct patient to call for assistance before getting out of bed/chair/stretcher/Non-slip footwear applied when patient is off stretcher/[place] to call system/Physically safe environment - no spills, clutter or unnecessary equipment/Purposeful proactive rounding/Room/bathroom lighting operational, light cord in reach Bed/Stretcher in lowest position, wheels locked, appropriate side rails in place/Call bell, personal items and telephone in reach/Instruct patient to call for assistance before getting out of bed/chair/stretcher/Non-slip footwear applied when patient is off stretcher/[place] to call system/Physically safe environment - no spills, clutter or unnecessary equipment/Purposeful proactive rounding/Room/bathroom lighting operational, light cord in reach

## 2023-12-27 NOTE — ED PROVIDER NOTE - NSFOLLOWUPINSTRUCTIONS_ED_ALL_ED_FT
Depression    WHAT YOU NEED TO KNOW:    Depression is a medical condition that causes feelings of sadness or hopelessness that do not go away. Depression may cause you to lose interest in things you used to enjoy. These feelings may interfere with your daily life.    DISCHARGE INSTRUCTIONS:    Call your local emergency number (911 in the ) if:     You think about harming yourself or someone else.      You have done something on purpose to hurt yourself.    Call your therapist or doctor if:     Your symptoms do not improve.      You cannot make it to your next appointment.       You have new symptoms.      You have questions or concerns about your condition or care.    The following resources are available at any time to help you, if needed:     National Suicide Prevention Lifeline: 1-642.403.8425 (9-616-405-TALK)      Suicide Hotline: 1-714.854.5821 (2-926-OLMUPWQ)      For a list of international numbers: https://[employer].org/find-help/international-resources/    Medicines:     Antidepressants may be given to improve or balance your mood. You may need to take this medicine for several weeks before you begin to feel better.      Take your medicine as directed. Contact your healthcare provider if you think your medicine is not helping or if you have side effects. Tell him of her if you are allergic to any medicine. Keep a list of the medicines, vitamins, and herbs you take. Include the amounts, and when and why you take them. Bring the list or the pill bottles to follow-up visits. Carry your medicine list with you in case of an emergency.    Therapy is often used together with medicine to relieve depression. Therapy is a way for you to talk about your feelings and anything that may be causing depression. Therapy can be done alone or in a group. It may also be done with family members or a significant other.    Self-care:     Get regular physical activity. Try to be active for 30 minutes, 3 to 5 days a week. Physical activity can help relieve depression. Work with your healthcare provider to develop a plan that you enjoy. It may help to ask someone to be active with you.      Create a regular sleep schedule. A routine can help you relax before bed. Listen to music, read, or do yoga. Try to go to bed and wake up at the same time every day. Sleep is important for emotional health.      Eat a variety of healthy foods. Healthy foods include fruits, vegetables, whole-grain breads, low-fat dairy products, lean meats, fish, and cooked beans. A healthy meal plan is low in fat, salt, and added sugar.      Do not drink alcohol or use drugs. Alcohol and drugs can make depression worse. Talk to your therapist or doctor if you need help quitting.    Follow up with your healthcare provider as directed: Your healthcare provider will monitor your progress at follow-up visits. He or she will also monitor your medicine if you take antidepressants. Your healthcare provider will ask if the medicine is helping. Tell him or her about any side effects or problems you may have with your medicine. The type or amount of medicine may need to be changed. Write down your questions so you remember to ask them during your visits. Depression    WHAT YOU NEED TO KNOW:    Depression is a medical condition that causes feelings of sadness or hopelessness that do not go away. Depression may cause you to lose interest in things you used to enjoy. These feelings may interfere with your daily life.    DISCHARGE INSTRUCTIONS:    Call your local emergency number (911 in the ) if:     You think about harming yourself or someone else.      You have done something on purpose to hurt yourself.    Call your therapist or doctor if:     Your symptoms do not improve.      You cannot make it to your next appointment.       You have new symptoms.      You have questions or concerns about your condition or care.    The following resources are available at any time to help you, if needed:     National Suicide Prevention Lifeline: 1-739.442.5015 (8-949-633-TALK)      Suicide Hotline: 1-268.468.8515 (9-709-NISMTMK)      For a list of international numbers: https://[employer].org/find-help/international-resources/    Medicines:     Antidepressants may be given to improve or balance your mood. You may need to take this medicine for several weeks before you begin to feel better.      Take your medicine as directed. Contact your healthcare provider if you think your medicine is not helping or if you have side effects. Tell him of her if you are allergic to any medicine. Keep a list of the medicines, vitamins, and herbs you take. Include the amounts, and when and why you take them. Bring the list or the pill bottles to follow-up visits. Carry your medicine list with you in case of an emergency.    Therapy is often used together with medicine to relieve depression. Therapy is a way for you to talk about your feelings and anything that may be causing depression. Therapy can be done alone or in a group. It may also be done with family members or a significant other.    Self-care:     Get regular physical activity. Try to be active for 30 minutes, 3 to 5 days a week. Physical activity can help relieve depression. Work with your healthcare provider to develop a plan that you enjoy. It may help to ask someone to be active with you.      Create a regular sleep schedule. A routine can help you relax before bed. Listen to music, read, or do yoga. Try to go to bed and wake up at the same time every day. Sleep is important for emotional health.      Eat a variety of healthy foods. Healthy foods include fruits, vegetables, whole-grain breads, low-fat dairy products, lean meats, fish, and cooked beans. A healthy meal plan is low in fat, salt, and added sugar.      Do not drink alcohol or use drugs. Alcohol and drugs can make depression worse. Talk to your therapist or doctor if you need help quitting.    Follow up with your healthcare provider as directed: Your healthcare provider will monitor your progress at follow-up visits. He or she will also monitor your medicine if you take antidepressants. Your healthcare provider will ask if the medicine is helping. Tell him or her about any side effects or problems you may have with your medicine. The type or amount of medicine may need to be changed. Write down your questions so you remember to ask them during your visits.

## 2023-12-28 VITALS
TEMPERATURE: 98 F | DIASTOLIC BLOOD PRESSURE: 75 MMHG | RESPIRATION RATE: 18 BRPM | SYSTOLIC BLOOD PRESSURE: 114 MMHG | OXYGEN SATURATION: 100 % | HEART RATE: 82 BPM

## 2024-01-08 ENCOUNTER — APPOINTMENT (OUTPATIENT)
Dept: PSYCHIATRY | Facility: CLINIC | Age: 24
End: 2024-01-08

## 2024-01-29 ENCOUNTER — APPOINTMENT (OUTPATIENT)
Dept: PSYCHIATRY | Facility: CLINIC | Age: 24
End: 2024-01-29
Payer: MEDICAID

## 2024-01-29 DIAGNOSIS — F33.3 MAJOR DEPRESSIVE DISORDER, RECURRENT, SEVERE WITH PSYCHOTIC SYMPTOMS: ICD-10-CM

## 2024-01-29 DIAGNOSIS — F43.10 POST-TRAUMATIC STRESS DISORDER, UNSPECIFIED: ICD-10-CM

## 2024-01-29 PROCEDURE — 96132 NRPSYC TST EVAL PHYS/QHP 1ST: CPT

## 2024-01-29 PROCEDURE — 96133 NRPSYC TST EVAL PHYS/QHP EA: CPT

## 2024-01-29 PROCEDURE — 96136 PSYCL/NRPSYC TST PHY/QHP 1ST: CPT

## 2024-01-29 PROCEDURE — 96137 PSYCL/NRPSYC TST PHY/QHP EA: CPT

## 2024-02-04 ENCOUNTER — TRANSCRIPTION ENCOUNTER (OUTPATIENT)
Age: 24
End: 2024-02-04

## 2024-03-05 PROBLEM — F43.10 COMPLEX POSTTRAUMATIC STRESS DISORDER: Status: ACTIVE | Noted: 2024-03-05

## 2024-03-05 PROBLEM — F33.3 SEVERE EPISODE OF RECURRENT MAJOR DEPRESSIVE DISORDER, WITH PSYCHOTIC FEATURES: Status: ACTIVE | Noted: 2024-03-05

## 2024-03-05 NOTE — DISCUSSION/SUMMARY
[FreeTextEntry8] : BEHAVIORAL OBSERVATIONS  Zully arrived on time to the evaluation. She appeared her stated age, took care in her self-presentation (i.e., wore make-up, jewelry, was dressed casually but appropriately), and walked independently. Zully evidenced some psychomotor slowing in both gross and fine motor tasks (i.e., walking and writing/drawing appeared slow). She completed writing and drawing activities with her left hand, using a tripod . Overall, she appeared to understand instructions well, and asked clarifying questions as needed. Although Zully is a proficient English speaker, she required repetition of some verbally-presented material (i.e., vocabulary and list learning tasks), and benefited from having words presented in written form coupled with their verbal presentation. Grammar, content, and organization of speech were all normal; rate of speech was slowed at times. Early in the day, Zully's tone was somewhat flat, but became more variable as she warmed to the testing environment. Similarly, her range of emotional expression broadened over the course of the day though. Eye contact was more frequent and direct as she became more familiar with the examiners, and she grew more comfortable with and interested in reciprocal conversation as the day progressed. She is very motivated to better understand her difficulties, and spoke openly and candidly about her experiences and current emotional/functional struggles.  Zully was engaged in all tasks presented to her, and persisted on challenging tasks. She was attentive and demonstrated care in her work; level of activity was broadly appropriate. At times she appeared anxious about her performance (i.e., when presented with a computation task, she stated, 'I'm not so good at math'). After finishing the testing day, Zully became dizzy in the office waiting area; she subsequently revealed that she had not eaten that day, and had taken her anxiety medication to manage anxiety. She was given a snack and appeared to feel better shortly thereafter, leaving the testing site independently to make her way home.  Overall, Zully appeared to put forth good effort on all tasks, and results of this evaluation are considered a valid representation of her current neuropsychological status.   ASSESSMENT RESULTS  The results of Zully's performance during this assessment are summarized below. All test scores and evaluation procedures are provided in an appendix at the end of this report.  Note: There are inherent limitations to interpretation of measures that are administered in English-only for bilingual individuals who may not be fully represented in normative samples. These limitations and Zully's bilingual language proficiency were considered when selecting, administering, and interpreting measures. Results are considered a reasonable representation of her skills and her functioning within predominantly English-speaking settings.   Intellectual functioning: Overall intellectual functioning was age-appropriate. Verbal comprehension skills (fund of vocabulary, abstract verbal reasoning) were stronger than perceptual reasoning (visual motor construction, pattern recognition), but all scores across domains were average compared to age-matched peers. In contrast, working memory skills (ability to process, store, and retrieve information) were below average, and processing speed (speed of work) was low average.  Visual motor/fine motor skills: Visual motor integration (hand-eye coordination) was average. Fine motor speed and dexterity was in the average range for both right dominant and non-dominant hands. Learning and memory: Ability to learn new verbal information was low average, impacted by lower efficiency in taking in large amounts of information at once (working memory), and some difficulties retrieving learned information. Recognition cues did not significantly improve recall, indicating memory difficulties also likely occur at the encoding stage. However, she is able to retain what she encoded. Performance on visual learning and memory tasks also demonstrated difficulties with retrieval (i.e., low average spontaneous recall of a complex figure she previously copied, but average recognition of individual parts of the figure).  Attention/Behavior Regulation: On standardized self-report measures, Zully endorsed significant concerns with inattention and memory; no difficulty with hyperactivity or impulsivity was reported. Zully's performance on a direct measure of sustained attention and inhibition, however, did not indicate any attentional or behavioral dysregulation. Performance on a measure of verbal inhibition was likewise in the average range. Executive Functioning: Executive function" refers to a set of cognitive skills important for regulating one's behavior in order to work towards goals, including ability to get started, persist, and monitor progress on tasks, working memory, inhibition of automatic responses, cognitive flexibility, organization, planning, and problem-solving skills. 	On self-report questionnaires, Zully indicated significant concerns across all domains of executive functioning in daily life, including initiation, planning and organization, self- and task-monitoring, flexibility, working memory, inhibition, and emotional control. 	Zully's performance on direct measures of executive functioning varied. Slowed processing was seen on speeded tasks (e.g., quickly naming the color of swatches of color). She also demonstrated difficulties on some tasks of mental flexibility, consistent with Zully's report that she has difficulty adjusting her behavior to environmental demands.  Academic Achievement: Word reading skills were average; math computation (requires more working memory and executive functioning skills) was below average. Adaptive functioning: Responses on a self-report measure of adaptive functioning--Zully's own estimation of her ability to meet the demands of her environment in daily lifewere exceptionally low across most domains, including communication, self- and home care, work, social engagement, and leisure; maintaining health and safety was an area of reported strength. Zully reported that she has struggled to maintain a job because she becomes emotionally and cognitively overwhelmed by the demands of work and "gets burnt out easily." Currently, she is sometimes able to prioritize one taskdoing laundry, washing dishes, picking up groceriesbut feels too depleted to do more than that most days.  Social/emotional/behavioral: Questionnaire-based measures of mood and anxiety indicate that Zully is currently experiencing severe symptoms of depression and anxiety, including sadness, anhedonia, avolition, guilt, nervousness, irritability, difficulty concentrating, agitation, loss of appetite, and self-criticism. In addition, Zully endorsed the presence of transient psychotic symptoms (paranoid ideation, visual hallucinations) in recent months. Zully did not report sufficient symptoms of bipolar disorder (i.e., limited evidence of higher self-esteem, sleep changes, flight of ideas during periods where she felt especially irritable).  In terms of social functioning, Zully overall demonstrated age-appropriate social skills. She demonstrated appropriate social reciprocity (ability to sustain back and forth conversation), social emotional understanding (e.g., understanding others' perspectives), understanding and use of nonverbal communication (e.g., range of affect, use of eye contact to modulate interaction coordinated with gestures and speech), and understanding of the role of purpose of interpersonal relationships (e.g., what being a friend means, the give-and-take of friendships).  Despite age-appropriate understanding of the role of interpersonal relationships and interest in relationships, Zully reports difficulty maintaining relationships (platonic and romantic); she struggles to feel close to others, and tends to feel pressured, disappointed, or misunderstood in interpersonal settings. Self-report measures of personality functioning indicate high levels of anxious distress, depression, painful affect (guilt, shame), lability, relational and ideational disturbances, lack of support, and suicidal ideation (on follow-up, Zully reported intermittent suicide ideation with no plan or intent). Zully was tearful throughout the clinical interview, and demonstrated considerable self-reflective capacities and a sophisticated understanding of interpersonal relationships.   SUMMARY and IMPRESSIONS Zully Lees is a 23-year-old female with history of Major Depressive Disorder and Post-Traumatic Stress Disorder. She was self-referred for neuropsychological evaluation to characterize her neuropsychological functioning, and to gain diagnostic clarity around autism spectrum or other neurodevelopmental disorders that may account for her long standing adaptive, interpersonal, and learning struggles. Zully reports symptoms of low mood, amotivation, feelings of worthlessness, and vulnerability to stress that significantly impair her daily functioning. She was recently prescribed buspirone for anxiety and depression and recently started therapy.  At this evaluation, Zully demonstrated age-appropriate skills in broad verbal and perceptual reasoning, visual motor and fine motor skills, and attention regulation. In context of these many strengths, she demonstrated relative weaknesses in working memory, processing speed, learning and memory (largely due to inefficient encoding and retrieval of information), executive functioning, math computation skills, and adaptive functioning. Furthermore, Zully's responses on self-report measures of mood, personality, and interpersonal functioning indicate multiple areas of concern, including high depression and anxiety, and poor self-image. For example, her subjective estimation of her capacity for attention regulation and executive functioning was significantly lower than her performance on direct measures of those domains, suggesting that she feels much more impaired than her actual performance indicates.  Zully's cognitive profile of strengths and weaknesses is consistent with both her history of major depressive disorder and post-traumatic stress disorder. Specifically, in addition to mood-related symptoms, individuals with major depressive disorder frequently present with slowed processing and psychomotor slowing, as well as difficulties with attention, concentration, memory, and adaptive functioning. The current evaluation did not produce evidence of a neurodevelopmental disorder such as  attention deficit/hyperactivity disorder. Zully's cognitive symptoms are compounded by her ongoing exposure to adverse relational conditions during early life, which likely changed the way she has developed behavior and attention regulation skills, organizational skills, social functioning, and stable, realistic self-evaluation. During the current assessment, Zully demonstrated appropriate social skills, and did not evidence any restrictive interests or repetitive behaviors. As such, she does not meet diagnostic criteria for Autism Spectrum Disorder. Her interpersonal difficulties are more likely attributable to early relational trauma.  Based on the results of the current assessment, Zully meets ICD-10 diagnostic criteria for Major Depressive Disorder (MDD) and Complex Post-Traumatic Stress Disorder (C-PTSD). Zully did not meet criteria for a personality disorder such as borderline personality disorder, as she does not experience extremes of idealization and devaluation of relationships. However, she does experience fears of abandonment, unstable relationships, unstable self-image, self-harm behaviors, mood instability, feelings of emptiness, and presence of paranoid ideation. At this time, these symptoms are best understood in the context of C-PTSD. A diagnosis of personality disorder is deferred pending further evaluation in the context of an ongoing psychotherapeutic treatment.  C-PTSD differs from PTSD in two important ways: in its etiology, and its symptoms. Whereas PTSD typically arises from a single traumatic event or series of related events occurring over a short period of time, C-PTSD originates from events or circumstances that occur repeatedly over an extended period of time. Moreover, while both disorders share basic diagnostic criteria regarding maladaptive and/or protracted responses to traumatic events, the addition of symptoms like difficulty with emotion regulation, interpersonal problems, and low self-worth characterize C-PTSD. Individuals with complex trauma often grow up in chaotic environments and do not receive adequate caregiving during crucial periods of development; as a result, they may lack the appropriate skills to meet the demands of their social and occupational environments. In the absence of intervention, these deficits in adaptive functioning tend to persist into adulthood, resulting in significantly impaired functioning. Moreover, symptoms such as low self-esteem, shame, and avolition secondary to major depressive disorder may make it more difficult to seek help. Actively managing mood symptoms is essential to supporting efforts toward the reparative work that will facilitate a return to optimal functioning. Zully is a creative, insightful individual with much to offer. We are optimistic that with the appropriate supports and interventions she will continue to develop and thrive in her occupational, relational, and community settings.  ICD-10 Diagnoses:	 F33.3		Recurrent major depressive disorder, current episode severe, with psychotic features F43.10		Complex post-traumatic stress disorder  [FreeTextEntry4] : Medical recommendations 1.	Given the significant impact of current depressive symptoms on her functioning, Zully should continue working actively with a psychiatrist toward optimizing her medication regimen to address severe depressive symptoms.  a.	[facility] [place], [place] [phone] 2.	Pursue individual psychotherapy informed by evidence-based treatment modalities such as Cognitive Behavioral Therapy (CBT) or Trauma-Focused Cognitive Behavioral Therapy (TF-CBT) to learn problem-solving skills, challenge cognitive distortions, and activate lifestyle and behavioral changes. a.	[facility] at [facility], [place], [place], [phone] b.	[email or web address] [phone]) c.	Fine TF-CBT certified therapists in your area (Trauma-Focused Cognitive Behavioral Therapy National Therapist Certification Program): [email or web address].[email or web address]/?tfcbt_[email or web address]_[email or web address]=&[email or web address]_[email or web address]_[email or web address]_[email or web address]  d.	[facility] at [facility], [place], [place] [phone] - for additional guidance on finding a therapist.  3.	Investigate other psychotherapy modalities developed for the treatment of complex/relational trauma and emotion dysregulation, including: a.	Dialectical Behavioral Therapy: DBT is a skills-based intervention that was developed specifically to treat individuals who struggle with emotion regulation. Treatment typically involves both individual and group sessions. [facility]'s DBT program requires a minimum one-year commitment, and includes weekly individual DBT sessions, weekly DBT skills group sessions, and monthly or quarterly medication management appointments. i.	[facility] Dialectical Behavioral Therapy (DBT)				  [place], [place] [phone] b.	Eye Movement Desensitization Reprocessing: EMDR is a well-researched, time-limited form of psychotherapy that treats trauma using bilateral sensory input. i.	[facility] [place], [place] [phone]  Home recommendations  1.	Explore C-PTSD resources to better understand your experience and learn about coping mechanisms. For example: a.	Complex PTSD: From Surviving to Thriving: A Guild Map for Recovering from Childhood Trauma, by STEFF Miranda b.	What My Bones Know: A Memoir of Healing from Complex Trauma, by Yareils callejas.	Trauma and Expressive Arts Therapy: Brain, Body, and Imagination in the Healing Process, by Cora Gramajo, Ph.D. d.	The Complex PTSD Workbook: A Mind-Body Approach to Regaining Emotional Control and Becoming Whole, by Veronica Avalos, Ph.D. e.	The Dialectical Behavior Therapy Skills Workbook: Practical DBT Exercises for Learning Mindfulness, Interpersonal Effectiveness, Emotion Regulation, and Distress Tolerance, by Bartolo Win, Ph.D., Rubén Parker Psy.[email or web address]., and Edgardo Leon MD  Occupational recommendations 1.	The ADA requires most employers to offer accommodations to employees whose mental illness impacts their performance at work. Such accommodations might include flexible schedules; modified job duties;  or training; alterations to supervisory protocols; etc. In Zully's case, for example, atypically slow processing speed may interfere with her ability to rapidly acquire new skills, particularly complex or multi-step procedures; accommodations for this might include extra time or guidance during skill acquisition, multi-modal instruction (e.g., learning of new skills is reinforced via demonstration, verbal instruction, and visual representation of instructions). Zully should qualify for workplace accommodations due to her psychological and cognitive needs.   It was a pleasure working with Zully. Please reach out with any questions at [phone] or email [email or web address] (attention Dr. Katarina Simpson).
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
## 2024-03-05 NOTE — REASON FOR VISIT
[Feedback of results of neuropsychological evaluation] : Feedback of results of neuropsychological evaluation [Patient] : Patient [Supervisor present for visit (for trainees)] : Supervisor present for visit (for trainees). [FreeTextEntry1] : Zully Lees is a 23-year-old female with history of Major Depressive Disorder and Posttraumatic Stress Disorder (PTSD). Zully self-referred for a neuropsychological evaluation characterize her neuropsychological functioning, gain diagnostic clarity around possible autism spectrum disorder or other neurodevelopmental disorders that may account for longstanding interpersonal, learning, and adaptive difficulties, and inform intervention recommendations.

## 2024-03-05 NOTE — ADDENDUM
[FreeTextEntry1] : APPENDIX OF SCORES AND PROCEDURES PROCEDURES ADMINISTERED 	Clinical Interview with Zully Lees (patient) 	Review of available medical records 	Wechsler Abbreviated Scale of Intelligence - 2nd Edition (WASI-II) 	Wechsler Adult Intelligence Scale - 4th Edition (WAIS-IV) 	Wide Range Achievement Test - 5th Edition (WRAT-5) 	IsabelleKishor Developmental Test of Visual-Motor Integration - 6th Edition (VMI) 	Isac Grooved Pegboard Test 	[place] Verbal Learning Test - 3rd edition (CVLT-3) 	Imtiaz Complex Figure Test (RCFT) 	Marjorie' Continuous Performance Test - 3rd Edition (CPT3) 	Marjorie' Adult ADHD Rating Scales, Short Version (CAARS-S:S) 	Ava-Olsen Executive Function System (D-KEFS) 	Trail Making Test 	Adaptive Behavior Assessment System - 3rd Edition (ABAS-3) 	Behavior Rating Inventory of Executive Function - 2nd Edition (BRIEF-2) Self 	Garcia Depression Inventory-2nd edition (BDI-II) 	Garcia Anxiety Inventory (CHERYL) 	Personality Assessment Inventory (NGUYEN) 	Autism Diagnostic Observation Schedule - 2nd Edition (ADOS-2) 	Social Communication Questionnaire (SCQ)  SCORES This score sheet is provided for the convenience of other professionals.  It accompanies a written report and should not be interpreted without reference to the report.  Scores are reported as follows:  Score Label	T-Score (T)	Standard Score (SS)	Z-Score  (Z) 	Scaled Score (Scs)	Percentile Rank Exceptionally Low	< 30	< 70	 < -2.0	< 4	< 2 Below Average	30-36	70-79	-2.0 to -1.4	4-5	2-8 Low Average	37-43	80-89	-1.3 to -0.7	6-7	9-24 Average	44-56		0.6 to -0.6	8-11	25-74 High Average	57-63	110-119	0.7-1.3	12-14	75-90 Above Average	64-69	120-129	1.4-1.9	15	91-97 Exceptionally High	> 70	> 130	> 2.0	 > 16	> 98 AE = age equivalent (years:months)			  Neuropsychological Test	Standardized Score	Percentile 	 INTELLECTUAL FUNCTIONING	Standardized Score	Percentile WASI-II 	 Block Design	T = 	45	31st Vocabulary	T =	52	58th Matrix Reasoning	T =	46	34th Similarities	T =	52	58th Verbal Comprehension (VCI)	SS = 	103	58th Perceptual Reasoning (NEREYDA)	SS =	93	32nd Full-Scale (FSIQ-2)	SS =	98	45th WAIS-IV	 Digit Span	Scs =	5	5th Digit Span Forward	Scs =	5	16th Digit Span Backward	Scs =	7	9th Digit Span Sequencing	Scs =	6	9th Symbol Search	Scs =	7	16th VISUAL MOTOR	Standardized Score	Percentile Grooved Pegboard	 Dominant Hand (Right)	Z = 	-0.36	36th Dominant Hand Drops		0	-- Non-Dominant Hand (Left)	Z =	-0.67	25th Non-Dominant Hand Drops		0	-- Beery VMI	 VMI	SS = 	98	45th MEMORY	Standardized Score	Percentile CVLT-3	 Trial 1 (raw =3)	Scs =	4	2nd Trial 2 (raw =6 )	Scs =	11	63rd Trial 3 (raw = 11)	Scs =	9	37th Trial 4 (raw = 10)	Scs =	8	25th Trial 5 (raw =10)	Scs =	7	16th Total Trials 1-5	SS =	87	19th List B	Scs =	4	2nd Short Delay Free Recall	Scs =	11	63rd Short Delay Cued Recall	Scs =	10	50th Long Delay Free Recall	Scs =	9	37th Long Delay Cued Recall	Scs =	8	25th Recognition Hits	Scs =	9	37th Recognition False Positives	Scs =	8	25th Discrimination	Scs =	9	37th Forced Choice	Scs =	9	37th RCFT	 Copy	z = 	-0.16	44th Immediate Recall	z = 	-1.45	7th Delayed Recall	z = 	-1.25	11th Recognition 	z = 	0.52	70th ATTENTION AND EXECUTIVE FUNCTIONING	Standardized Score	Percentile BRIEF-A (higher scores indicate more impairment)	 Inhibit	T = 	77	>99th Shift	T = 	86	>99th Emotional Control	T = 	74	99th Self-Monitoring	T = 	72	99th Initiate	T = 	85	>99th Working Memory	T = 	89	>99th Plan and Organize	T = 	89	>99th Task Monitoring	T = 	86	>99th Organization of Material	T = 	50	50th Behavioral Regulation	T = 	82	>99th Metacognition	T = 	84	>99th General Executive Control	T = 	86	>99th Marjorie CPT-3 (higher scores indicate more impairment)	 d'	T = 	45	31st Omissions	T =	44	27th Commissions	T =	52	58th Perseverations	T =	47	38th Hit Reaction Time	T =	48	42nd HRT SD	T =	38	12th Variability	T =	39	14th Block Change	T =	59	82nd TASHA Change	T =	52	58th CAARS-Self report			 Inattention/Memory Problems	T = 	57	76th Hyperactivity/Restlessness	T = 	45	31st Impulsivity/Emotional Lability	T = 	50	50th Problems w/Self-Concept	T = 	66	95th ADHD Index	T = 	77	>99th DKEFS Color-Word Interference Test	 Color Naming	Scs =	4	2nd Word Reading	Scs =	10	50th Inhibition	Scs =	8	25th Inhibition/Switching	Scs =	12	75th Inhibition Errors	Scs =	12	75th Inhibition/Switching Errors	Scs =	11	63rd Marshall Making Test	 Part A	Z =	-5.10	<1st Part A (Errors)		0	-- Part B	Z =	-4.02	<1st Part B (Errors)		0	-- ACADEMIC ACHIEVEMENT	Standardized Score	Percentile WRAT-5	 Word Reading	SS = 	103	58th Math Computation	SS =	76	5th SOCIAL / EMOTIONAL / BEHAVIORAL / ADAPTIVE	Standardized Score	Percentile BDI-II	Raw = 	 40	Severe CHERYL	Raw = 	27	Severe NGUYEN	 ICN	T = 	58	79th INF	T = 	44	27th NIM	T = 	99	>99th PIM	T = 	<20	<1st RAHUL	T = 	79	>99th ANX	T = 	78	>99th JILL	T = 	101	>99th DEP	T = 	90	>99th MAN	T = 	91	>99th PAR	T = 	78	>99th SCZ	T = 	101	>99th BOR	T = 	98	>99th ANT	T = 	65	93rd ALC	T = 	54	66th DRG	T = 	68	96th AGG	T = 	57	76th JANNETTE	T = 	82	>99th STR	T = 	82	>99th NON	T = 	83	>99th ABAS-3 	 Communication	Scs = 	3	1st Community Use	Scs = 	4	2nd Functional Academics	Scs = 	4	2nd Home Living	Scs = 	1	<1st Health and Safety	Scs = 	9	37th Leisure	Scs = 	4	2nd Self-Care	Scs = 	3	1st Self-Direction	Scs = 	2	<1st Social	Scs = 	4	2nd Work	Scs =	3	1st General Adaptive Composite	SS = 	60	<1st Conceptual	SS = 	58	<1st Social	SS = 	55	<1st Practical	SS = 	62	1st

## 2024-03-19 ENCOUNTER — APPOINTMENT (OUTPATIENT)
Dept: OTOLARYNGOLOGY | Facility: CLINIC | Age: 24
End: 2024-03-19
Payer: MEDICAID

## 2024-03-19 VITALS
DIASTOLIC BLOOD PRESSURE: 68 MMHG | WEIGHT: 117 LBS | BODY MASS INDEX: 20.73 KG/M2 | HEIGHT: 63 IN | SYSTOLIC BLOOD PRESSURE: 94 MMHG

## 2024-03-19 DIAGNOSIS — Z87.891 PERSONAL HISTORY OF NICOTINE DEPENDENCE: ICD-10-CM

## 2024-03-19 DIAGNOSIS — J35.8 OTHER CHRONIC DISEASES OF TONSILS AND ADENOIDS: ICD-10-CM

## 2024-03-19 DIAGNOSIS — R07.0 PAIN IN THROAT: ICD-10-CM

## 2024-03-19 PROCEDURE — 31575 DIAGNOSTIC LARYNGOSCOPY: CPT

## 2024-03-19 PROCEDURE — 99203 OFFICE O/P NEW LOW 30 MIN: CPT | Mod: 25

## 2024-03-19 RX ORDER — ESCITALOPRAM OXALATE 5 MG/1
TABLET, FILM COATED ORAL
Refills: 0 | Status: ACTIVE | COMMUNITY

## 2024-03-19 RX ORDER — ATOMOXETINE HYDROCHLORIDE 100 MG/1
CAPSULE ORAL
Refills: 0 | Status: ACTIVE | COMMUNITY

## 2024-03-19 NOTE — PHYSICAL EXAM
[Normal] : mucosa is normal [Midline] : trachea located in midline position [] : septum deviated to the right [de-identified] : 1.5+ cryptic tonsil

## 2024-03-19 NOTE — ASSESSMENT
[FreeTextEntry1] : 24Y F present tonsil stones  Patient states has history of tonsillitis 2x treated with antiobiotics in 2023 however no culture due to sensitive gag reflex unable to get swab.  Laryngoscopy shows cryptic tonsils. No swelling or posterior enlargement. Vocal fold mobile. Airway Patent  Physical exam shows 1.5+ cryptic tonsils, no exudates  Recommend Tonsil Stones - Discussed brushing and floss regularly. Make sure to brush the front and back of your tongue, too. Quit smoking. Gargle with salt water after eating. -Trial of using a water pick to rinse tonsil and help dislodge any tonsil stones. -Stay hydrated by drinking plenty of water.  -Return to clinic as needed or sooner if new/worsen symptoms present

## 2024-03-19 NOTE — HISTORY OF PRESENT ILLNESS
[de-identified] : 24 year old female presents for swollen tonsils. States that for a couple of months she has felt like her tonsils are swollen.  States that she thinks she has tonsil stones, she tries to pick out, but it is very painful. Went to [facility] about 2 weeks ago for a cold and was given an antibiotic (unsure which one it was) Denies dysphagia, odynophagia, aspirations, dyspnea, dysphonia, globus sensation, otalgia.  Denies use of over the counter antacids or reflux medications.

## 2024-10-31 NOTE — ED PROVIDER NOTE - NSRISKOFTRANSFER_ED_A_ED
154.94 Transportation Risk (There is always a risk of traffic delays resulting in deterioration of condition.)